# Patient Record
Sex: FEMALE | Race: WHITE | NOT HISPANIC OR LATINO | Employment: UNEMPLOYED | URBAN - METROPOLITAN AREA
[De-identification: names, ages, dates, MRNs, and addresses within clinical notes are randomized per-mention and may not be internally consistent; named-entity substitution may affect disease eponyms.]

---

## 2023-10-30 ENCOUNTER — HOSPITAL ENCOUNTER (OUTPATIENT)
Facility: HOSPITAL | Age: 33
Discharge: HOME | End: 2023-10-30
Attending: OBSTETRICS & GYNECOLOGY | Admitting: OBSTETRICS & GYNECOLOGY
Payer: COMMERCIAL

## 2023-10-30 VITALS — RESPIRATION RATE: 16 BRPM | SYSTOLIC BLOOD PRESSURE: 119 MMHG | TEMPERATURE: 98.1 F | DIASTOLIC BLOOD PRESSURE: 56 MMHG

## 2023-10-30 PROCEDURE — 250N000009 HC RX 250: Performed by: OBSTETRICS & GYNECOLOGY

## 2023-10-30 PROCEDURE — 59300 EPISIOTOMY OR VAGINAL REPAIR: CPT

## 2023-10-30 RX ORDER — ONDANSETRON 4 MG/1
4 TABLET, ORALLY DISINTEGRATING ORAL EVERY 6 HOURS PRN
Status: DISCONTINUED | OUTPATIENT
Start: 2023-10-30 | End: 2023-10-30 | Stop reason: HOSPADM

## 2023-10-30 RX ORDER — PROCHLORPERAZINE MALEATE 10 MG
10 TABLET ORAL EVERY 6 HOURS PRN
Status: DISCONTINUED | OUTPATIENT
Start: 2023-10-30 | End: 2023-10-30 | Stop reason: HOSPADM

## 2023-10-30 RX ORDER — METOCLOPRAMIDE 10 MG/1
10 TABLET ORAL EVERY 6 HOURS PRN
Status: DISCONTINUED | OUTPATIENT
Start: 2023-10-30 | End: 2023-10-30 | Stop reason: HOSPADM

## 2023-10-30 RX ORDER — METOCLOPRAMIDE HYDROCHLORIDE 5 MG/ML
10 INJECTION INTRAMUSCULAR; INTRAVENOUS EVERY 6 HOURS PRN
Status: DISCONTINUED | OUTPATIENT
Start: 2023-10-30 | End: 2023-10-30 | Stop reason: HOSPADM

## 2023-10-30 RX ORDER — ONDANSETRON 2 MG/ML
4 INJECTION INTRAMUSCULAR; INTRAVENOUS EVERY 6 HOURS PRN
Status: DISCONTINUED | OUTPATIENT
Start: 2023-10-30 | End: 2023-10-30 | Stop reason: HOSPADM

## 2023-10-30 RX ORDER — LIDOCAINE HYDROCHLORIDE 10 MG/ML
10 INJECTION, SOLUTION EPIDURAL; INFILTRATION; INTRACAUDAL; PERINEURAL ONCE
Status: COMPLETED | OUTPATIENT
Start: 2023-10-30 | End: 2023-10-30

## 2023-10-30 RX ORDER — PROCHLORPERAZINE 25 MG
25 SUPPOSITORY, RECTAL RECTAL EVERY 12 HOURS PRN
Status: DISCONTINUED | OUTPATIENT
Start: 2023-10-30 | End: 2023-10-30 | Stop reason: HOSPADM

## 2023-10-30 RX ADMIN — LIDOCAINE HYDROCHLORIDE 10 ML: 10 INJECTION, SOLUTION EPIDURAL; INFILTRATION; INTRACAUDAL; PERINEURAL at 07:33

## 2023-10-30 NOTE — PROGRESS NOTES
Margie Blair is a 32 year old  who is s/p  at home with a midwife at 412 am who presented for labial and periurethral laceration repair. Doing well, had prior lacerations, one to her labia which left a defect.     O:  /56   Temp 98.1  F (36.7  C) (Axillary)   Resp 16   Gen: Alert, NAD  No vaginal or perineal lacerations, right periurethral and labial laceration    A/P:  Margie Blair is a  who presents s/p  at home with labial and perineal lacerations. Will repair in the labor room and discharge patient to home.    Marylin Reynoso MD, FACOG, [facility]  10/30/2023 7:55 AM

## 2023-10-30 NOTE — PROCEDURES
REPAIR OF LABIAL AND PERIURETHRAL LACERATION     NAME: Margie Blair   : 1990   MRN: [id number]     DATE OF SERVICE:  10/30/2023     PREOPERATIVE DIAGNOSIS: periurethral and labial laceration    POSTOPERATIVE DIAGNOSIS: Same    PROCEDURES: Repair of periurethral and labial laceration    SURGEON: Rebekah Reynoso MD     ANESTHESIA:  10 cc Local lidocaine 1%    ESTIMATED BLOOD LOSS:   1 cc    PROCEDURE:  The patient was positioned in a labor room. Patient was placed in dorsal lithotomy position. Exam was performed which showed a prior defect in the labia and from that defect there was a superior labial and a right periurethral laceration. These were infiltrated with 1% lidocaine. A 4-0 Vicryl was used in a running fashion to close the labial and periurethral defects. Discussion with patient was had to see if she wanted the prior labial defect removed, she declined at this point. Good hemostasis was noted with this portion of the procedure. Sponge and needle counts were correct X 2. She stayed in her L&D room in stable condition. Patient tolerated the procedure well.    Rebekah Reynoso MD

## 2023-10-30 NOTE — PROGRESS NOTES
Pt was here for vaginal tear post home delivery. She was stable status, vital signs stable, minimal vaginal flow. She was repaired by Dr Reynoso and discharged home. Discharged instructions were given.